# Patient Record
Sex: FEMALE | Race: BLACK OR AFRICAN AMERICAN | NOT HISPANIC OR LATINO
[De-identification: names, ages, dates, MRNs, and addresses within clinical notes are randomized per-mention and may not be internally consistent; named-entity substitution may affect disease eponyms.]

---

## 2017-02-03 ENCOUNTER — TRANSCRIPTION ENCOUNTER (OUTPATIENT)
Age: 42
End: 2017-02-03

## 2017-03-30 ENCOUNTER — EMERGENCY (EMERGENCY)
Facility: HOSPITAL | Age: 42
LOS: 1 days | Discharge: HOME | End: 2017-03-30
Attending: EMERGENCY MEDICINE
Payer: COMMERCIAL

## 2017-03-30 VITALS
HEART RATE: 94 BPM | SYSTOLIC BLOOD PRESSURE: 146 MMHG | WEIGHT: 199.96 LBS | RESPIRATION RATE: 20 BRPM | HEIGHT: 62 IN | TEMPERATURE: 98 F | DIASTOLIC BLOOD PRESSURE: 89 MMHG | OXYGEN SATURATION: 100 %

## 2017-03-30 DIAGNOSIS — J98.01 ACUTE BRONCHOSPASM: ICD-10-CM

## 2017-03-30 DIAGNOSIS — R51 HEADACHE: ICD-10-CM

## 2017-03-30 DIAGNOSIS — R91.1 SOLITARY PULMONARY NODULE: ICD-10-CM

## 2017-03-30 LAB
ALBUMIN SERPL ELPH-MCNC: 4.4 G/DL — SIGNIFICANT CHANGE UP (ref 3.3–5.2)
ALP SERPL-CCNC: 53 U/L — SIGNIFICANT CHANGE UP (ref 40–120)
ALT FLD-CCNC: 19 U/L — SIGNIFICANT CHANGE UP
ANION GAP SERPL CALC-SCNC: 14 MMOL/L — SIGNIFICANT CHANGE UP (ref 5–17)
AST SERPL-CCNC: 25 U/L — SIGNIFICANT CHANGE UP
BASOPHILS # BLD AUTO: 0 K/UL — SIGNIFICANT CHANGE UP (ref 0–0.2)
BASOPHILS NFR BLD AUTO: 0.6 % — SIGNIFICANT CHANGE UP (ref 0–2)
BILIRUB SERPL-MCNC: 0.6 MG/DL — SIGNIFICANT CHANGE UP (ref 0.4–2)
BUN SERPL-MCNC: 11 MG/DL — SIGNIFICANT CHANGE UP (ref 8–20)
CALCIUM SERPL-MCNC: 9.9 MG/DL — SIGNIFICANT CHANGE UP (ref 8.6–10.2)
CHLORIDE SERPL-SCNC: 97 MMOL/L — LOW (ref 98–107)
CO2 SERPL-SCNC: 25 MMOL/L — SIGNIFICANT CHANGE UP (ref 22–29)
CREAT SERPL-MCNC: 0.63 MG/DL — SIGNIFICANT CHANGE UP (ref 0.5–1.3)
EOSINOPHIL # BLD AUTO: 0.1 K/UL — SIGNIFICANT CHANGE UP (ref 0–0.5)
EOSINOPHIL NFR BLD AUTO: 2.5 % — SIGNIFICANT CHANGE UP (ref 0–6)
GLUCOSE SERPL-MCNC: 92 MG/DL — SIGNIFICANT CHANGE UP (ref 70–115)
HCG UR QL: NEGATIVE — SIGNIFICANT CHANGE UP
HCT VFR BLD CALC: 35.9 % — LOW (ref 37–47)
HGB BLD-MCNC: 11.3 G/DL — LOW (ref 12–16)
LYMPHOCYTES # BLD AUTO: 1.2 K/UL — SIGNIFICANT CHANGE UP (ref 1–4.8)
LYMPHOCYTES # BLD AUTO: 36.6 % — SIGNIFICANT CHANGE UP (ref 20–55)
MCHC RBC-ENTMCNC: 26.3 PG — LOW (ref 27–31)
MCHC RBC-ENTMCNC: 31.5 G/DL — LOW (ref 32–36)
MCV RBC AUTO: 83.5 FL — SIGNIFICANT CHANGE UP (ref 81–99)
MONOCYTES # BLD AUTO: 0.5 K/UL — SIGNIFICANT CHANGE UP (ref 0–0.8)
MONOCYTES NFR BLD AUTO: 16.4 % — HIGH (ref 3–10)
NEUTROPHILS # BLD AUTO: 1.4 K/UL — LOW (ref 1.8–8)
NEUTROPHILS NFR BLD AUTO: 43.9 % — SIGNIFICANT CHANGE UP (ref 37–73)
PLATELET # BLD AUTO: 326 K/UL — SIGNIFICANT CHANGE UP (ref 150–400)
POTASSIUM SERPL-MCNC: 4.1 MMOL/L — SIGNIFICANT CHANGE UP (ref 3.5–5.3)
POTASSIUM SERPL-SCNC: 4.1 MMOL/L — SIGNIFICANT CHANGE UP (ref 3.5–5.3)
PROT SERPL-MCNC: 8.8 G/DL — HIGH (ref 6.6–8.7)
RBC # BLD: 4.3 M/UL — LOW (ref 4.4–5.2)
RBC # FLD: 14 % — SIGNIFICANT CHANGE UP (ref 11–15.6)
SODIUM SERPL-SCNC: 136 MMOL/L — SIGNIFICANT CHANGE UP (ref 135–145)
WBC # BLD: 3.2 K/UL — LOW (ref 4.8–10.8)
WBC # FLD AUTO: 3.2 K/UL — LOW (ref 4.8–10.8)

## 2017-03-30 PROCEDURE — 93005 ELECTROCARDIOGRAM TRACING: CPT

## 2017-03-30 PROCEDURE — 71250 CT THORAX DX C-: CPT

## 2017-03-30 PROCEDURE — 99284 EMERGENCY DEPT VISIT MOD MDM: CPT

## 2017-03-30 PROCEDURE — 81025 URINE PREGNANCY TEST: CPT

## 2017-03-30 PROCEDURE — 94640 AIRWAY INHALATION TREATMENT: CPT

## 2017-03-30 PROCEDURE — 93010 ELECTROCARDIOGRAM REPORT: CPT

## 2017-03-30 PROCEDURE — 99284 EMERGENCY DEPT VISIT MOD MDM: CPT | Mod: 25

## 2017-03-30 PROCEDURE — 80053 COMPREHEN METABOLIC PANEL: CPT

## 2017-03-30 PROCEDURE — 71020: CPT | Mod: 26

## 2017-03-30 PROCEDURE — 84702 CHORIONIC GONADOTROPIN TEST: CPT

## 2017-03-30 PROCEDURE — 71250 CT THORAX DX C-: CPT | Mod: 26

## 2017-03-30 PROCEDURE — 85027 COMPLETE CBC AUTOMATED: CPT

## 2017-03-30 PROCEDURE — 71046 X-RAY EXAM CHEST 2 VIEWS: CPT

## 2017-03-30 RX ORDER — ALBUTEROL 90 UG/1
2.5 AEROSOL, METERED ORAL ONCE
Qty: 0 | Refills: 0 | Status: COMPLETED | OUTPATIENT
Start: 2017-03-30 | End: 2017-03-30

## 2017-03-30 RX ORDER — ALBUTEROL 90 UG/1
2 AEROSOL, METERED ORAL
Qty: 1 | Refills: 0 | OUTPATIENT
Start: 2017-03-30 | End: 2017-04-29

## 2017-03-30 RX ADMIN — ALBUTEROL 2.5 MILLIGRAM(S): 90 AEROSOL, METERED ORAL at 12:48

## 2017-03-30 RX ADMIN — Medication 40 MILLIGRAM(S): at 12:48

## 2017-03-30 NOTE — ED STATDOCS - OBJECTIVE STATEMENT
40 y/o F pt presents to ED c/o nonproductive cough, headache and subjective fevers for a few days. No medication taken. No hx of DVT or PE. no neck pain. no chest pain or sob. no abd pain. no n/v/d. no urinary f/u/d. no back pain. no motor or sensory deficits. denies drug use. no recent travel. no rash. no other acute issues symptoms or concerns

## 2017-03-30 NOTE — ED STATDOCS - NS ED MD SCRIBE ATTENDING SCRIBE SECTIONS
REVIEW OF SYSTEMS/INTAKE ASSESSMENT/SCREENINGS/HISTORY OF PRESENT ILLNESS/DISPOSITION/VITAL SIGNS( Pullset)/PHYSICAL EXAM/HIV/PAST MEDICAL/SURGICAL/SOCIAL HISTORY

## 2017-03-30 NOTE — ED STATDOCS - MEDICAL DECISION MAKING DETAILS
cough/bronchospasm: CXR concerning for sarcoidosis vs lymphoma, labs, chest CT, neb tx, prednisone f/u with pulmonology

## 2017-03-30 NOTE — ED STATDOCS - CARE PLAN
Principal Discharge DX:	Bronchospasm Principal Discharge DX:	Bronchospasm  Secondary Diagnosis:	Pulmonary nodule

## 2017-03-30 NOTE — ED STATDOCS - ATTENDING CONTRIBUTION TO CARE
I, Mickey Conrad, performed the initial face to face bedside interview with this patient regarding history of present illness, review of symptoms and relevant past medical, social and family history.  I completed an independent physical examination.  I was the initial provider who evaluated this patient.  The history, relevant review of systems, past medical and surgical history, medical decision making, and physical examination was documented by the scribe in my presence and I attest to the accuracy of the documentation.  I have signed out the follow up of any pending tests (i.e. labs, radiological studies) to the ACP.  I have communicated the patient’s plan of care and disposition with the ACP.

## 2017-03-30 NOTE — ED STATDOCS - INTERPRETATION
normal sinus rhythm, Normal axis, Normal NH interval and QRS complex. There are no acute ischemic ST or T-wave changes.

## 2017-03-30 NOTE — ED ADULT NURSE NOTE - OBJECTIVE STATEMENT
pt c/o persistent cough x 1 week, and today felt headache,  reports he has noticed wife gets more short of breath with exertion than usual. pt denies pain, iv placed, pt breathing even and unlabored

## 2017-03-30 NOTE — ED STATDOCS - PROGRESS NOTE DETAILS
patient re-evaluated c/o cough x 1 month, with intermittent fevers, denies any weight loss, n/v/d or any sick contacts or recent travel or rashes, PE: Chest CTAB, heart: s1s2, CXR abnormal from prior CXR sarcoidosis vs lymphoma, labs and CT chest ordered, will re-evaluate patient informed of pulmonary findings of nodules on CT will need f/u with pulm, patient understood and agrees to proceed.

## 2017-04-07 ENCOUNTER — APPOINTMENT (OUTPATIENT)
Dept: PULMONOLOGY | Facility: CLINIC | Age: 42
End: 2017-04-07

## 2017-04-25 ENCOUNTER — RECORD ABSTRACTING (OUTPATIENT)
Age: 42
End: 2017-04-25

## 2017-04-25 ENCOUNTER — APPOINTMENT (OUTPATIENT)
Dept: PULMONOLOGY | Facility: CLINIC | Age: 42
End: 2017-04-25

## 2017-04-25 VITALS
OXYGEN SATURATION: 97 % | SYSTOLIC BLOOD PRESSURE: 140 MMHG | WEIGHT: 218 LBS | DIASTOLIC BLOOD PRESSURE: 78 MMHG | BODY MASS INDEX: 41.16 KG/M2 | HEIGHT: 61 IN | HEART RATE: 93 BPM

## 2017-04-25 DIAGNOSIS — Z83.3 FAMILY HISTORY OF DIABETES MELLITUS: ICD-10-CM

## 2017-04-25 DIAGNOSIS — Z86.69 PERSONAL HISTORY OF OTHER DISEASES OF THE NERVOUS SYSTEM AND SENSE ORGANS: ICD-10-CM

## 2017-04-25 DIAGNOSIS — R09.81 NASAL CONGESTION: ICD-10-CM

## 2017-04-25 RX ORDER — OMEGA-3/DHA/EPA/FISH OIL 300-1000MG
CAPSULE ORAL
Refills: 0 | Status: ACTIVE | COMMUNITY

## 2017-04-25 RX ORDER — CHROMIUM 200 MCG
TABLET ORAL
Refills: 0 | Status: ACTIVE | COMMUNITY

## 2017-04-25 RX ORDER — ALBUTEROL SULFATE 90 UG/1
108 (90 BASE) AEROSOL, METERED RESPIRATORY (INHALATION)
Refills: 0 | Status: ACTIVE | COMMUNITY

## 2017-04-25 RX ORDER — FLUTICASONE PROPIONATE 100 UG/1
100 POWDER, METERED RESPIRATORY (INHALATION) TWICE DAILY
Qty: 1 | Refills: 3 | Status: ACTIVE | COMMUNITY
Start: 2017-04-25 | End: 1900-01-01

## 2017-04-26 RX ORDER — FLUTICASONE PROPIONATE 220 MCG
0 AEROSOL WITH ADAPTER (GRAM) INHALATION
Qty: 60 | Refills: 0 | COMMUNITY
Start: 2017-04-26

## 2017-05-01 ENCOUNTER — OUTPATIENT (OUTPATIENT)
Dept: OUTPATIENT SERVICES | Facility: HOSPITAL | Age: 42
LOS: 1 days | End: 2017-05-01

## 2017-05-01 ENCOUNTER — APPOINTMENT (OUTPATIENT)
Dept: NUCLEAR MEDICINE | Facility: CLINIC | Age: 42
End: 2017-05-01

## 2017-05-01 DIAGNOSIS — R59.0 LOCALIZED ENLARGED LYMPH NODES: ICD-10-CM

## 2017-05-01 DIAGNOSIS — R91.8 OTHER NONSPECIFIC ABNORMAL FINDING OF LUNG FIELD: ICD-10-CM

## 2017-05-05 ENCOUNTER — MESSAGE (OUTPATIENT)
Age: 42
End: 2017-05-05

## 2017-05-05 DIAGNOSIS — E04.1 NONTOXIC SINGLE THYROID NODULE: ICD-10-CM

## 2017-05-09 RX ORDER — OLOPATADINE HYDROCHLORIDE 1 MG/ML
0 SOLUTION/ DROPS OPHTHALMIC
Qty: 5 | Refills: 0 | COMMUNITY
Start: 2017-05-09

## 2017-05-09 RX ORDER — CETIRIZINE HYDROCHLORIDE 10 MG/1
0 TABLET ORAL
Qty: 30 | Refills: 0 | COMMUNITY
Start: 2017-05-09

## 2017-05-10 ENCOUNTER — APPOINTMENT (OUTPATIENT)
Dept: THORACIC SURGERY | Facility: CLINIC | Age: 42
End: 2017-05-10

## 2017-05-10 VITALS
RESPIRATION RATE: 18 BRPM | DIASTOLIC BLOOD PRESSURE: 90 MMHG | OXYGEN SATURATION: 100 % | HEART RATE: 86 BPM | WEIGHT: 220 LBS | SYSTOLIC BLOOD PRESSURE: 131 MMHG | BODY MASS INDEX: 40.48 KG/M2 | HEIGHT: 62 IN

## 2017-05-10 DIAGNOSIS — J45.909 UNSPECIFIED ASTHMA, UNCOMPLICATED: ICD-10-CM

## 2017-05-10 DIAGNOSIS — R01.1 CARDIAC MURMUR, UNSPECIFIED: ICD-10-CM

## 2017-05-18 ENCOUNTER — OUTPATIENT (OUTPATIENT)
Dept: OUTPATIENT SERVICES | Facility: HOSPITAL | Age: 42
LOS: 1 days | End: 2017-05-18
Payer: COMMERCIAL

## 2017-05-18 VITALS
HEART RATE: 88 BPM | TEMPERATURE: 98 F | HEIGHT: 61 IN | WEIGHT: 222.67 LBS | RESPIRATION RATE: 20 BRPM | DIASTOLIC BLOOD PRESSURE: 110 MMHG | SYSTOLIC BLOOD PRESSURE: 170 MMHG

## 2017-05-18 DIAGNOSIS — Z98.891 HISTORY OF UTERINE SCAR FROM PREVIOUS SURGERY: Chronic | ICD-10-CM

## 2017-05-18 DIAGNOSIS — I10 ESSENTIAL (PRIMARY) HYPERTENSION: ICD-10-CM

## 2017-05-18 DIAGNOSIS — R59.0 LOCALIZED ENLARGED LYMPH NODES: ICD-10-CM

## 2017-05-18 DIAGNOSIS — Z01.818 ENCOUNTER FOR OTHER PREPROCEDURAL EXAMINATION: ICD-10-CM

## 2017-05-18 LAB
ANION GAP SERPL CALC-SCNC: 12 MMOL/L — SIGNIFICANT CHANGE UP (ref 5–17)
APTT BLD: 36.3 SEC — SIGNIFICANT CHANGE UP (ref 27.5–37.4)
BLD GP AB SCN SERPL QL: SIGNIFICANT CHANGE UP
BUN SERPL-MCNC: 11 MG/DL — SIGNIFICANT CHANGE UP (ref 8–20)
CALCIUM SERPL-MCNC: 10.1 MG/DL — SIGNIFICANT CHANGE UP (ref 8.6–10.2)
CHLORIDE SERPL-SCNC: 101 MMOL/L — SIGNIFICANT CHANGE UP (ref 98–107)
CO2 SERPL-SCNC: 28 MMOL/L — SIGNIFICANT CHANGE UP (ref 22–29)
CREAT SERPL-MCNC: 0.49 MG/DL — LOW (ref 0.5–1.3)
EOSINOPHIL # BLD AUTO: 0.1 K/UL — SIGNIFICANT CHANGE UP (ref 0–0.5)
EOSINOPHIL NFR BLD AUTO: 5 % — SIGNIFICANT CHANGE UP (ref 0–5)
GLUCOSE SERPL-MCNC: 98 MG/DL — SIGNIFICANT CHANGE UP (ref 70–115)
HCT VFR BLD CALC: 35.4 % — LOW (ref 37–47)
HGB BLD-MCNC: 11.2 G/DL — LOW (ref 12–16)
INR BLD: 1.09 RATIO — SIGNIFICANT CHANGE UP (ref 0.88–1.16)
LYMPHOCYTES # BLD AUTO: 1 K/UL — SIGNIFICANT CHANGE UP (ref 1–4.8)
LYMPHOCYTES # BLD AUTO: 39 % — SIGNIFICANT CHANGE UP (ref 20–55)
MCHC RBC-ENTMCNC: 26.4 PG — LOW (ref 27–31)
MCHC RBC-ENTMCNC: 31.6 G/DL — LOW (ref 32–36)
MCV RBC AUTO: 83.5 FL — SIGNIFICANT CHANGE UP (ref 81–99)
MONOCYTES # BLD AUTO: 0.3 K/UL — SIGNIFICANT CHANGE UP (ref 0–0.8)
MONOCYTES NFR BLD AUTO: 14 % — HIGH (ref 3–10)
NEUTROPHILS # BLD AUTO: 0.9 K/UL — LOW (ref 1.8–8)
NEUTROPHILS NFR BLD AUTO: 42 % — SIGNIFICANT CHANGE UP (ref 37–73)
PLAT MORPH BLD: NORMAL — SIGNIFICANT CHANGE UP
PLATELET # BLD AUTO: 309 K/UL — SIGNIFICANT CHANGE UP (ref 150–400)
POTASSIUM SERPL-MCNC: 4.1 MMOL/L — SIGNIFICANT CHANGE UP (ref 3.5–5.3)
POTASSIUM SERPL-SCNC: 4.1 MMOL/L — SIGNIFICANT CHANGE UP (ref 3.5–5.3)
PROTHROM AB SERPL-ACNC: 12 SEC — SIGNIFICANT CHANGE UP (ref 9.8–12.7)
RBC # BLD: 4.24 M/UL — LOW (ref 4.4–5.2)
RBC # FLD: 15.2 % — SIGNIFICANT CHANGE UP (ref 11–15.6)
RBC BLD AUTO: NORMAL — SIGNIFICANT CHANGE UP
SODIUM SERPL-SCNC: 141 MMOL/L — SIGNIFICANT CHANGE UP (ref 135–145)
TYPE + AB SCN PNL BLD: SIGNIFICANT CHANGE UP
WBC # BLD: 2.4 K/UL — LOW (ref 4.8–10.8)
WBC # FLD AUTO: 2.4 K/UL — LOW (ref 4.8–10.8)

## 2017-05-18 RX ORDER — CEFAZOLIN SODIUM 1 G
2000 VIAL (EA) INJECTION ONCE
Qty: 0 | Refills: 0 | Status: DISCONTINUED | OUTPATIENT
Start: 2017-05-24 | End: 2017-06-08

## 2017-05-18 RX ORDER — SODIUM CHLORIDE 9 MG/ML
3 INJECTION INTRAMUSCULAR; INTRAVENOUS; SUBCUTANEOUS ONCE
Qty: 0 | Refills: 0 | Status: DISCONTINUED | OUTPATIENT
Start: 2017-05-24 | End: 2017-05-24

## 2017-05-18 NOTE — H&P PST ADULT - FAMILY HISTORY
Mother  Still living? Yes, Estimated age: 61-70  Family history of diabetes mellitus, Age at diagnosis: 51-60

## 2017-05-18 NOTE — H&P PST ADULT - NSANTHOSAYNRD_GEN_A_CORE
No. KANDACE screening performed.  STOP BANG Legend: 0-2 = LOW Risk; 3-4 = INTERMEDIATE Risk; 5-8 = HIGH Risk

## 2017-05-18 NOTE — H&P PST ADULT - HISTORY OF PRESENT ILLNESS
40 yo female with c/o cough for several months, PET scan revealed mediastinal adenopathy, planning EBUS possible medastinoscopy.

## 2017-05-24 ENCOUNTER — TRANSCRIPTION ENCOUNTER (OUTPATIENT)
Age: 42
End: 2017-05-24

## 2017-05-24 ENCOUNTER — RESULT REVIEW (OUTPATIENT)
Age: 42
End: 2017-05-24

## 2017-05-24 ENCOUNTER — OUTPATIENT (OUTPATIENT)
Dept: OUTPATIENT SERVICES | Facility: HOSPITAL | Age: 42
LOS: 1 days | End: 2017-05-24
Payer: COMMERCIAL

## 2017-05-24 ENCOUNTER — APPOINTMENT (OUTPATIENT)
Dept: THORACIC SURGERY | Facility: HOSPITAL | Age: 42
End: 2017-05-24

## 2017-05-24 VITALS
DIASTOLIC BLOOD PRESSURE: 81 MMHG | SYSTOLIC BLOOD PRESSURE: 142 MMHG | TEMPERATURE: 98 F | HEART RATE: 81 BPM | WEIGHT: 222.67 LBS | RESPIRATION RATE: 16 BRPM | OXYGEN SATURATION: 100 % | HEIGHT: 61 IN

## 2017-05-24 VITALS
DIASTOLIC BLOOD PRESSURE: 54 MMHG | RESPIRATION RATE: 18 BRPM | SYSTOLIC BLOOD PRESSURE: 104 MMHG | TEMPERATURE: 98 F | HEART RATE: 93 BPM | OXYGEN SATURATION: 97 %

## 2017-05-24 DIAGNOSIS — Z98.891 HISTORY OF UTERINE SCAR FROM PREVIOUS SURGERY: Chronic | ICD-10-CM

## 2017-05-24 DIAGNOSIS — R59.0 LOCALIZED ENLARGED LYMPH NODES: ICD-10-CM

## 2017-05-24 LAB
ABO RH CONFIRMATION: SIGNIFICANT CHANGE UP
GRAM STN FLD: SIGNIFICANT CHANGE UP
SPECIMEN SOURCE: SIGNIFICANT CHANGE UP

## 2017-05-24 PROCEDURE — 87015 SPECIMEN INFECT AGNT CONCNTJ: CPT

## 2017-05-24 PROCEDURE — 31653 BRONCH EBUS SAMPLNG 3/> NODE: CPT | Mod: GC

## 2017-05-24 PROCEDURE — 85730 THROMBOPLASTIN TIME PARTIAL: CPT

## 2017-05-24 PROCEDURE — 87070 CULTURE OTHR SPECIMN AEROBIC: CPT

## 2017-05-24 PROCEDURE — 31625 BRONCHOSCOPY W/BIOPSY(S): CPT

## 2017-05-24 PROCEDURE — 86900 BLOOD TYPING SEROLOGIC ABO: CPT

## 2017-05-24 PROCEDURE — 88333 PATH CONSLTJ SURG CYTO XM 1: CPT

## 2017-05-24 PROCEDURE — G0463: CPT

## 2017-05-24 PROCEDURE — 88305 TISSUE EXAM BY PATHOLOGIST: CPT | Mod: 26

## 2017-05-24 PROCEDURE — 86920 COMPATIBILITY TEST SPIN: CPT

## 2017-05-24 PROCEDURE — 85610 PROTHROMBIN TIME: CPT

## 2017-05-24 PROCEDURE — 88173 CYTOPATH EVAL FNA REPORT: CPT | Mod: 26,59

## 2017-05-24 PROCEDURE — 86901 BLOOD TYPING SEROLOGIC RH(D): CPT

## 2017-05-24 PROCEDURE — 31654 BRONCH EBUS IVNTJ PERPH LES: CPT

## 2017-05-24 PROCEDURE — 88173 CYTOPATH EVAL FNA REPORT: CPT

## 2017-05-24 PROCEDURE — 85027 COMPLETE CBC AUTOMATED: CPT

## 2017-05-24 PROCEDURE — 88305 TISSUE EXAM BY PATHOLOGIST: CPT

## 2017-05-24 PROCEDURE — 88112 CYTOPATH CELL ENHANCE TECH: CPT

## 2017-05-24 PROCEDURE — 88333 PATH CONSLTJ SURG CYTO XM 1: CPT | Mod: 26

## 2017-05-24 PROCEDURE — 87116 MYCOBACTERIA CULTURE: CPT

## 2017-05-24 PROCEDURE — 87206 SMEAR FLUORESCENT/ACID STAI: CPT

## 2017-05-24 PROCEDURE — 87102 FUNGUS ISOLATION CULTURE: CPT

## 2017-05-24 PROCEDURE — 86850 RBC ANTIBODY SCREEN: CPT

## 2017-05-24 PROCEDURE — 80048 BASIC METABOLIC PNL TOTAL CA: CPT

## 2017-05-24 PROCEDURE — 31629 BRONCHOSCOPY/NEEDLE BX EACH: CPT | Mod: GC

## 2017-05-24 PROCEDURE — 31624 DX BRONCHOSCOPE/LAVAGE: CPT | Mod: GC

## 2017-05-24 PROCEDURE — 88112 CYTOPATH CELL ENHANCE TECH: CPT | Mod: 26

## 2017-05-24 RX ORDER — HYDROMORPHONE HYDROCHLORIDE 2 MG/ML
0.5 INJECTION INTRAMUSCULAR; INTRAVENOUS; SUBCUTANEOUS
Qty: 0 | Refills: 0 | Status: DISCONTINUED | OUTPATIENT
Start: 2017-05-24 | End: 2017-05-24

## 2017-05-24 RX ORDER — AMLODIPINE BESYLATE 2.5 MG/1
1 TABLET ORAL
Qty: 0 | Refills: 0 | COMMUNITY

## 2017-05-24 RX ORDER — SODIUM CHLORIDE 9 MG/ML
1000 INJECTION INTRAMUSCULAR; INTRAVENOUS; SUBCUTANEOUS
Qty: 0 | Refills: 0 | Status: DISCONTINUED | OUTPATIENT
Start: 2017-05-24 | End: 2017-05-24

## 2017-05-24 RX ORDER — FENTANYL CITRATE 50 UG/ML
50 INJECTION INTRAVENOUS
Qty: 0 | Refills: 0 | Status: DISCONTINUED | OUTPATIENT
Start: 2017-05-24 | End: 2017-05-24

## 2017-05-24 RX ORDER — ACETAMINOPHEN 500 MG
1000 TABLET ORAL ONCE
Qty: 0 | Refills: 0 | Status: DISCONTINUED | OUTPATIENT
Start: 2017-05-24 | End: 2017-05-24

## 2017-05-24 RX ORDER — ONDANSETRON 8 MG/1
4 TABLET, FILM COATED ORAL ONCE
Qty: 0 | Refills: 0 | Status: DISCONTINUED | OUTPATIENT
Start: 2017-05-24 | End: 2017-05-24

## 2017-05-24 NOTE — ASU DISCHARGE PLAN (ADULT/PEDIATRIC). - MEDICATION SUMMARY - MEDICATIONS TO TAKE
I will START or STAY ON the medications listed below when I get home from the hospital:    ibuprofen 600 mg oral tablet  -- 1 tab(s) by mouth 4 times a day  -- Do not take this drug if you are pregnant.  It is very important that you take or use this exactly as directed.  Do not skip doses or discontinue unless directed by your doctor.  May cause drowsiness or dizziness.  Obtain medical advice before taking any non-prescription drugs as some may affect the action of this medication.  Take with food or milk.    -- Indication: For Pain    CETIRIZINE HCL 10 MG TABLET  -- Indication: For allergy    albuterol 90 mcg/inh inhalation aerosol  -- 2 puff(s) inhaled 4 times a day  -- For inhalation only.  It is very important that you take or use this exactly as directed.  Do not skip doses or discontinue unless directed by your doctor.  Obtain medical advice before taking any non-prescription drugs as some may affect the action of this medication.  Shake well before use.    -- Indication: For airway    amLODIPine 5 mg oral tablet  -- 1 tab(s) by mouth once a day  -- Indication: For blood pressure    OLOPATADINE HCL 0.1% EYE DROPS  --  to each affected eye , As Needed  -- Indication: For eye drops    FLOVENT 100 MCG DISKUS  --  inhaled 2 times a day  -- Indication: For airway

## 2017-05-24 NOTE — BRIEF OPERATIVE NOTE - PROCEDURE
EBUS, with biopsy  05/24/2017  Flex bronch, endobronchial ultrasound, endobronchial biopsy, transthoracic needle biopsy  Active  NLE

## 2017-05-25 LAB
NIGHT BLUE STAIN TISS: SIGNIFICANT CHANGE UP
SPECIMEN SOURCE: SIGNIFICANT CHANGE UP

## 2017-05-26 LAB
CULTURE RESULTS: SIGNIFICANT CHANGE UP
SPECIMEN SOURCE: SIGNIFICANT CHANGE UP

## 2017-05-31 LAB — CYTOLOGY FNA REPORT: SIGNIFICANT CHANGE UP

## 2017-06-06 ENCOUNTER — APPOINTMENT (OUTPATIENT)
Dept: ULTRASOUND IMAGING | Facility: CLINIC | Age: 42
End: 2017-06-06

## 2017-06-06 ENCOUNTER — OUTPATIENT (OUTPATIENT)
Dept: OUTPATIENT SERVICES | Facility: HOSPITAL | Age: 42
LOS: 1 days | End: 2017-06-06

## 2017-06-06 DIAGNOSIS — Z00.8 ENCOUNTER FOR OTHER GENERAL EXAMINATION: ICD-10-CM

## 2017-06-06 DIAGNOSIS — Z98.891 HISTORY OF UTERINE SCAR FROM PREVIOUS SURGERY: Chronic | ICD-10-CM

## 2017-06-12 ENCOUNTER — APPOINTMENT (OUTPATIENT)
Dept: THORACIC SURGERY | Facility: CLINIC | Age: 42
End: 2017-06-12

## 2017-06-12 VITALS
HEIGHT: 62 IN | SYSTOLIC BLOOD PRESSURE: 136 MMHG | WEIGHT: 220 LBS | OXYGEN SATURATION: 99 % | RESPIRATION RATE: 16 BRPM | HEART RATE: 97 BPM | BODY MASS INDEX: 40.48 KG/M2 | DIASTOLIC BLOOD PRESSURE: 90 MMHG

## 2017-06-12 RX ORDER — CETIRIZINE HYDROCHLORIDE 10 MG/1
10 TABLET, COATED ORAL
Qty: 30 | Refills: 0 | Status: ACTIVE | COMMUNITY
Start: 2017-05-09

## 2017-06-23 ENCOUNTER — APPOINTMENT (OUTPATIENT)
Dept: PULMONOLOGY | Facility: CLINIC | Age: 42
End: 2017-06-23
Payer: COMMERCIAL

## 2017-06-23 VITALS — OXYGEN SATURATION: 96 % | SYSTOLIC BLOOD PRESSURE: 120 MMHG | DIASTOLIC BLOOD PRESSURE: 80 MMHG | HEART RATE: 78 BPM

## 2017-06-23 DIAGNOSIS — E66.8 OTHER DISORDERS OF LUNG: ICD-10-CM

## 2017-06-23 DIAGNOSIS — R91.8 OTHER NONSPECIFIC ABNORMAL FINDING OF LUNG FIELD: ICD-10-CM

## 2017-06-23 DIAGNOSIS — J98.4 OTHER DISORDERS OF LUNG: ICD-10-CM

## 2017-06-23 DIAGNOSIS — R91.1 SOLITARY PULMONARY NODULE: ICD-10-CM

## 2017-06-23 DIAGNOSIS — R59.0 LOCALIZED ENLARGED LYMPH NODES: ICD-10-CM

## 2017-06-23 DIAGNOSIS — R06.83 SNORING: ICD-10-CM

## 2017-06-23 DIAGNOSIS — R06.02 SHORTNESS OF BREATH: ICD-10-CM

## 2017-06-23 PROCEDURE — 99215 OFFICE O/P EST HI 40 MIN: CPT

## 2017-06-26 LAB
CULTURE RESULTS: SIGNIFICANT CHANGE UP
SPECIMEN SOURCE: SIGNIFICANT CHANGE UP

## 2017-07-08 LAB
CULTURE RESULTS: SIGNIFICANT CHANGE UP
SPECIMEN SOURCE: SIGNIFICANT CHANGE UP

## 2017-10-18 ENCOUNTER — OUTPATIENT (OUTPATIENT)
Dept: OUTPATIENT SERVICES | Facility: HOSPITAL | Age: 42
LOS: 1 days | End: 2017-10-18
Payer: COMMERCIAL

## 2017-10-18 DIAGNOSIS — Z98.891 HISTORY OF UTERINE SCAR FROM PREVIOUS SURGERY: Chronic | ICD-10-CM

## 2017-10-18 DIAGNOSIS — G47.33 OBSTRUCTIVE SLEEP APNEA (ADULT) (PEDIATRIC): ICD-10-CM

## 2017-10-18 PROCEDURE — 95810 POLYSOM 6/> YRS 4/> PARAM: CPT | Mod: 26

## 2017-10-18 PROCEDURE — 95810 POLYSOM 6/> YRS 4/> PARAM: CPT

## 2017-10-30 ENCOUNTER — APPOINTMENT (OUTPATIENT)
Dept: PULMONOLOGY | Facility: CLINIC | Age: 42
End: 2017-10-30
Payer: COMMERCIAL

## 2017-10-30 VITALS
HEART RATE: 76 BPM | HEIGHT: 62 IN | WEIGHT: 226 LBS | OXYGEN SATURATION: 98 % | SYSTOLIC BLOOD PRESSURE: 130 MMHG | DIASTOLIC BLOOD PRESSURE: 74 MMHG | BODY MASS INDEX: 41.59 KG/M2

## 2017-10-30 DIAGNOSIS — G47.33 OBSTRUCTIVE SLEEP APNEA (ADULT) (PEDIATRIC): ICD-10-CM

## 2017-10-30 DIAGNOSIS — E66.01 MORBID (SEVERE) OBESITY DUE TO EXCESS CALORIES: ICD-10-CM

## 2017-10-30 PROCEDURE — 99214 OFFICE O/P EST MOD 30 MIN: CPT

## 2018-03-26 ENCOUNTER — APPOINTMENT (OUTPATIENT)
Dept: INTERNAL MEDICINE | Facility: CLINIC | Age: 43
End: 2018-03-26

## 2018-03-28 ENCOUNTER — APPOINTMENT (OUTPATIENT)
Dept: INTERNAL MEDICINE | Facility: CLINIC | Age: 43
End: 2018-03-28

## 2018-08-29 ENCOUNTER — EMERGENCY (EMERGENCY)
Facility: HOSPITAL | Age: 43
LOS: 1 days | Discharge: HOME | End: 2018-08-29
Attending: EMERGENCY MEDICINE
Payer: SELF-PAY

## 2018-08-29 VITALS
SYSTOLIC BLOOD PRESSURE: 161 MMHG | DIASTOLIC BLOOD PRESSURE: 129 MMHG | TEMPERATURE: 99 F | HEART RATE: 82 BPM | RESPIRATION RATE: 18 BRPM | OXYGEN SATURATION: 98 %

## 2018-08-29 VITALS — WEIGHT: 229.94 LBS | HEIGHT: 62 IN

## 2018-08-29 DIAGNOSIS — Z98.891 HISTORY OF UTERINE SCAR FROM PREVIOUS SURGERY: Chronic | ICD-10-CM

## 2018-08-29 PROCEDURE — 99284 EMERGENCY DEPT VISIT MOD MDM: CPT

## 2018-08-29 PROCEDURE — 93971 EXTREMITY STUDY: CPT

## 2018-08-29 PROCEDURE — 73610 X-RAY EXAM OF ANKLE: CPT

## 2018-08-29 PROCEDURE — 73630 X-RAY EXAM OF FOOT: CPT

## 2018-08-29 PROCEDURE — 99284 EMERGENCY DEPT VISIT MOD MDM: CPT | Mod: 25

## 2018-08-29 PROCEDURE — 73610 X-RAY EXAM OF ANKLE: CPT | Mod: 26,RT

## 2018-08-29 PROCEDURE — 73630 X-RAY EXAM OF FOOT: CPT | Mod: 26,RT

## 2018-08-29 PROCEDURE — 93971 EXTREMITY STUDY: CPT | Mod: 26,RT

## 2018-08-29 RX ORDER — IBUPROFEN 200 MG
1 TABLET ORAL
Qty: 28 | Refills: 0 | OUTPATIENT
Start: 2018-08-29 | End: 2018-09-04

## 2018-08-29 NOTE — ED STATDOCS - ATTENDING CONTRIBUTION TO CARE
I, Nirali Shepherd, performed the initial face to face bedside interview with this patient regarding history of present illness, review of symptoms and relevant past medical, social and family history.  I completed an independent physical examination.  I was the initial provider who evaluated this patient. I have signed out the follow up of any pending tests (i.e. labs, radiological studies) to the ACP.  I have communicated the patient’s plan of care and disposition with the ACP.  The history, relevant review of systems, past medical and surgical history, medical decision making, and physical examination was documented by the scribe in my presence and I attest to the accuracy of the documentation.

## 2018-08-29 NOTE — ED STATDOCS - MEDICAL DECISION MAKING DETAILS
Pt with one week of ankle pain. Will check XR and refer to ortho/podiatry Pt with one week of ankle pain ?atraumatic. Will check XR and refer to ortho/podiatry

## 2018-08-29 NOTE — ED ADULT NURSE NOTE - OBJECTIVE STATEMENT
pt arrived with ankle pain, pt denies any trauma, pt with difficulty putting weight on it, pt with good pulses

## 2018-08-29 NOTE — ED STATDOCS - OBJECTIVE STATEMENT
Pt is a 44 y/o F presenting to the ED with c/o right ankle pain, onset 1 week ago. Pt states that she began feeling the pain shortly after taking a 10 hour car drive. She reports taking breaks throughout the car ride and was ambulatory but developed a progressive onset of worsening pain. NO direct trauma. She admits to intermittent foot spasms. Denies swelling and numbness/tingling/weakness to the LE. Pt states she previously strained that ankle several years ago and received PT with improvement. However, pt states this pain feels different. 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 Some relief in pain with 600mg motrin. Pt is a 42 y/o F presenting to the ED with c/o right ankle pain, onset 1 week ago. Pt states that she began feeling the pain shortly after taking a 10 hour car drive. She reports taking breaks throughout the car ride and was ambulatory but developed a progressive onset of worsening pain. NO direct trauma. She admits to intermittent foot spasms. Denies swelling and numbness/tingling/weakness to the LE. Pt denies any calf pain. Pt states she previously sprained that ankle several years ago and received PT with improvement. However, pt states this pain feels different. 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 Some relief in pain with 600mg motrin.

## 2018-08-29 NOTE — ED STATDOCS - RIGHT ANKLE TENDERNESS, MLM
tenderness with inversion and plantarflexion of the foot, no anterior joint line tenderness, no tenderness over the malleoli, no navicular/midfoot/achilles/calcaneal tenderness tenderness with inversion and plantar flexion of the foot, no anterior joint line tenderness, no tenderness over the malleoli, no navicular/midfoot/achilles/calcaneal tenderness

## 2018-08-29 NOTE — ED ADULT NURSE NOTE - NSIMPLEMENTINTERV_GEN_ALL_ED
Implemented All Universal Safety Interventions:  [place] to call system. Call bell, personal items and telephone within reach. Instruct patient to call for assistance. Room bathroom lighting operational. Non-slip footwear when patient is off stretcher. Physically safe environment: no spills, clutter or unnecessary equipment. Stretcher in lowest position, wheels locked, appropriate side rails in place.

## 2018-08-29 NOTE — ED STATDOCS - PHYSICAL EXAMINATION
2+ dp   no gross swelling  no ajlt   no ten malleili  no tend over navuluar  no mid foot tenderness  no tenderness over achhiles  no calcaneal tenderness   tenderness with PF and inversion of the foot 2+ dp   no gross swelling  no ajlt   no ten malleoli  no tend over navicular  no mid foot tenderness  no tenderness over achilles  no calcaneal tenderness   tenderness with PF and inversion of the foot

## 2018-08-29 NOTE — ED STATDOCS - PROGRESS NOTE DETAILS
PT seen by intake MD and agree with HPI/ROS/PE/Plan. Will dc with crutches or cane and ace wrap with podiatry eval.

## 2018-08-29 NOTE — ED ADULT TRIAGE NOTE - CHIEF COMPLAINT QUOTE
'I am having worsening ankle pain. " Pt states she has pain in her ankle for about a week. states feels like a cramping pain and it hurts to walk or put weight on her ankle" PT A & OX4,

## 2018-08-30 PROBLEM — I10 ESSENTIAL (PRIMARY) HYPERTENSION: Chronic | Status: ACTIVE | Noted: 2017-05-18

## 2019-08-15 ENCOUNTER — EMERGENCY (EMERGENCY)
Facility: HOSPITAL | Age: 44
LOS: 1 days | Discharge: HOME | End: 2019-08-15
Attending: EMERGENCY MEDICINE
Payer: COMMERCIAL

## 2019-08-15 VITALS
HEIGHT: 62 IN | OXYGEN SATURATION: 99 % | TEMPERATURE: 99 F | WEIGHT: 229.94 LBS | DIASTOLIC BLOOD PRESSURE: 105 MMHG | HEART RATE: 86 BPM | SYSTOLIC BLOOD PRESSURE: 163 MMHG | RESPIRATION RATE: 16 BRPM

## 2019-08-15 DIAGNOSIS — Z98.891 HISTORY OF UTERINE SCAR FROM PREVIOUS SURGERY: Chronic | ICD-10-CM

## 2019-08-15 PROCEDURE — 93005 ELECTROCARDIOGRAM TRACING: CPT

## 2019-08-15 PROCEDURE — 73030 X-RAY EXAM OF SHOULDER: CPT | Mod: 26,LT

## 2019-08-15 PROCEDURE — 99284 EMERGENCY DEPT VISIT MOD MDM: CPT

## 2019-08-15 PROCEDURE — 73060 X-RAY EXAM OF HUMERUS: CPT | Mod: 26,LT

## 2019-08-15 PROCEDURE — 73060 X-RAY EXAM OF HUMERUS: CPT

## 2019-08-15 PROCEDURE — 99284 EMERGENCY DEPT VISIT MOD MDM: CPT | Mod: 25

## 2019-08-15 PROCEDURE — 93971 EXTREMITY STUDY: CPT

## 2019-08-15 PROCEDURE — 93010 ELECTROCARDIOGRAM REPORT: CPT

## 2019-08-15 PROCEDURE — 73030 X-RAY EXAM OF SHOULDER: CPT

## 2019-08-15 PROCEDURE — 93971 EXTREMITY STUDY: CPT | Mod: 26,LT

## 2019-08-15 RX ORDER — ACETAMINOPHEN 500 MG
975 TABLET ORAL ONCE
Refills: 0 | Status: COMPLETED | OUTPATIENT
Start: 2019-08-15 | End: 2019-08-15

## 2019-08-15 RX ADMIN — Medication 975 MILLIGRAM(S): at 13:37

## 2019-08-15 NOTE — ED STATDOCS - CLINICAL SUMMARY MEDICAL DECISION MAKING FREE TEXT BOX
Pt presenting with atraumatic left shoulder and arm pain. Notes mild swelling. Will obtain xray of shoulder and humerus, US to rule UE DVT given swelling. If negative work up, dc with ortho follow up./ NO weakness, no numbness

## 2019-08-15 NOTE — ED STATDOCS - PROGRESS NOTE DETAILS
NICHOLAS NYE: No acute findings on US. + Calcific tendonitis appreciated on xray. Will place pt in sling for comfort and educate on maintaining ROM of arm. NSAIDS for pain and ortho follow up ( Dr. Aviles )

## 2019-08-15 NOTE — ED ADULT NURSE NOTE - NSIMPLEMENTINTERV_GEN_ALL_ED
Implemented All Universal Safety Interventions:  [place] to call system. Call bell, personal items and telephone within reach. Instruct patient to call for assistance. Room bathroom lighting operational. Non-slip footwear when patient is off stretcher. Physically safe environment: no spills, clutter or unnecessary equipment. Stretcher in lowest position, wheels locked, appropriate side rails in place.

## 2019-08-15 NOTE — ED STATDOCS - OBJECTIVE STATEMENT
Pt is a 43 y/o RHD F presenting to the ED with c/o left arm pain for the last five days. Pt states the pain began in the mid upper arm spontaneously and has been progressively worsening since. She states the pain is worse with certain positions and she can only abduct the arm if she lifts it herself. She notes a sharp pain in the deltoid region of the arm. + swelling to the left wrist. Denies numbness/tingling/weakness, fever, CP, SOB. No trauma or injury to the arm. She reports going to urgent care regarding her sxs yesterday. Did not have any imaging or testing done and was advised to f/u with ortho. However, she states the pain became so intense last night, not rleived with motrin, prompting her visit today. Last dose motrin at 0800 this AM.

## 2019-08-15 NOTE — ED ADULT NURSE NOTE - OBJECTIVE STATEMENT
Pt presents to ED A&Ox3 c/o left arm pain x5 days, worse with movement and radiation to left deltoid. Pt denies any chest pain or discomfort. Denies any sob or difficulty breathing. Left wrist with + swelling, states she was told to f/u with an orthopedic. + peripheral pulses. denies any other complaints at this time, limited ROM due to pain. Pending xrays and EKG to determine further dispo.

## 2019-08-15 NOTE — ED STATDOCS - MUSCULOSKELETAL, MLM
LUE exam: pain with supination, mild anterior shoulder TTP, pain with abduction, FROM, no spinal tenderness

## 2019-08-29 ENCOUNTER — APPOINTMENT (OUTPATIENT)
Dept: ORTHOPEDIC SURGERY | Facility: CLINIC | Age: 44
End: 2019-08-29
Payer: COMMERCIAL

## 2019-08-29 VITALS
HEART RATE: 83 BPM | WEIGHT: 226 LBS | DIASTOLIC BLOOD PRESSURE: 109 MMHG | BODY MASS INDEX: 41.59 KG/M2 | HEIGHT: 62 IN | SYSTOLIC BLOOD PRESSURE: 166 MMHG

## 2019-08-29 DIAGNOSIS — M75.32 CALCIFIC TENDINITIS OF LEFT SHOULDER: ICD-10-CM

## 2019-08-29 DIAGNOSIS — M25.512 PAIN IN LEFT SHOULDER: ICD-10-CM

## 2019-08-29 PROCEDURE — 99203 OFFICE O/P NEW LOW 30 MIN: CPT

## 2019-08-29 RX ORDER — MELOXICAM 15 MG/1
15 TABLET ORAL DAILY
Qty: 14 | Refills: 0 | Status: ACTIVE | COMMUNITY
Start: 2019-08-29 | End: 1900-01-01

## 2019-09-01 NOTE — DISCUSSION/SUMMARY
[de-identified] : 44F with left shoulder pain, imaging suggested of calcific tendinitis of the rotator cuff\par \par -We discussed in detail the clinical and imaging findings\par -We discussed the pathophysiology and natural history of patient's condition\par -I referred her to OT to work on ROM, pain control, edema control, modalities, strengthening, home exercises.  WBAT. \par -I prescribed a short course of meloxicam 15 mg daily for two weeks.  We discussed the GI and renal side effects of NSAIDs.\par -I referred her to see my sports medicine colleague Dr. Dwyer for possible steroid injection\par -I will see her as needed\par -Patient had the opportunity to ask questions and she was in agreement with the plan\par -Over 50% of the time spent with the patient was on counseling the patient on the above diagnosis, treatment plan and prognosis.\par

## 2019-09-01 NOTE — REASON FOR VISIT
[Initial Visit] : an initial visit for [Shoulder Pain] : shoulder pain [Other: ____] : [unfilled] [FreeTextEntry2] : Seen in [facility] ER 08/15/2019

## 2019-09-01 NOTE — HISTORY OF PRESENT ILLNESS
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  It started around 8/10/19.  She went to an urgent care center and then to [facility] ED as pain worsened over time.  Imaging indicated calcific tendinitis and she was referred to our office for follow up.\par \par She denies prior injury.\par Currently her pain is sharp, stabbing, with radiation along the arm down to the wrist.\par She denies paresthesia.\par She denies night symptoms.\par Symptoms improve with heat and ibuprofen.\par Symptoms worsen with activity and lying down.\par She is not diabetic.\par She denies history of thyroid disease.\par She denies current tobacco use.\par She denies recreational drug use.\par She does not take any narcotic pain medication.

## 2019-09-01 NOTE — REVIEW OF SYSTEMS
[Joint Pain] : joint pain [Sleep Disturbances] : ~T sleep disturbances [Hot Flashes] : hot flashes [Negative] : Heme/Lymph

## 2019-09-01 NOTE — PHYSICAL EXAM
[de-identified] : GENERAL: Awake, alert, cooperative, answers questions appropriately. No acute distress.  Ambulates independently with a normal gait.\par SKIN: Warm, dry, intact. Color and turgor normal. \par LUNGS: Demonstrates unlabored breathing on room air with no accessory muscle use.\par EXTREMITIES: Warm and well-perfused. \par NEUROLOGICAL: Grossly intact.  Spurling negative\par \par FOCUSED UPPER EXTREMITY EXAM: \par Left Shoulder:\par -skin intact, no erythema, no ecchymosis, swelling; normal contour compared to the other side\par -mild tenderness to palpation lateral shoulder just inferior to the acromion\par -no tenderness to palpation glenohumeral joint line\par -negative Marcos's test\par -no tenderness to palpation AC joint; negative cross-body adduction test\par -no tenderness to palpation long head of biceps\par -decreased AROM due to pain; normal PROM\par -positive impingement sign\par -rotator cuff strength normal with provocative tests\par -negative Yergason's test\par -motor intact Ax/R/U/M/AIN/PIN\par -sensation intact R/U/M\par -2+ radial pulse, digits warm and well perfused\par -compartments soft\par  [de-identified] : No new XRs were taken during today's visit.\par \par XRs of left shoulder (3 views) from 8/15/19 were reviewed with patient, showing no acute fracture or dislocation.  Calcific deposition noted off of greater tuberosity suggestive of calcific tendinitis.

## 2019-10-10 NOTE — ED STATDOCS - CHIEF COMPLAINT
Continue with labetalol and clonidine The patient is a 44y year old Female complaining of arm pain/injury.

## 2020-03-03 NOTE — ED STATDOCS - CCCP TRG CHIEF CMPLNT
few years ago Perla Andreai ( [facility]) as per daughter - negative cath arm pain/injury

## 2020-05-04 ENCOUNTER — TRANSCRIPTION ENCOUNTER (OUTPATIENT)
Age: 45
End: 2020-05-04

## 2021-08-14 ENCOUNTER — TRANSCRIPTION ENCOUNTER (OUTPATIENT)
Age: 46
End: 2021-08-14

## 2022-12-06 ENCOUNTER — EMERGENCY (EMERGENCY)
Facility: HOSPITAL | Age: 47
LOS: 0 days | Discharge: HOME | End: 2022-12-06
Attending: STUDENT IN AN ORGANIZED HEALTH CARE EDUCATION/TRAINING PROGRAM
Payer: COMMERCIAL

## 2022-12-06 VITALS
RESPIRATION RATE: 18 BRPM | DIASTOLIC BLOOD PRESSURE: 92 MMHG | SYSTOLIC BLOOD PRESSURE: 144 MMHG | HEART RATE: 91 BPM | OXYGEN SATURATION: 100 %

## 2022-12-06 VITALS — WEIGHT: 270.07 LBS
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
## 2022-12-06 DIAGNOSIS — S63.501A UNSPECIFIED SPRAIN OF RIGHT WRIST, INITIAL ENCOUNTER: ICD-10-CM

## 2022-12-06 DIAGNOSIS — S93.402A SPRAIN OF UNSPECIFIED LIGAMENT OF LEFT ANKLE, INITIAL ENCOUNTER: ICD-10-CM
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
## 2022-12-06 DIAGNOSIS — M79.675 PAIN IN LEFT TOE(S): ICD-10-CM

## 2022-12-06 DIAGNOSIS — I10 ESSENTIAL (PRIMARY) HYPERTENSION: ICD-10-CM

## 2022-12-06 DIAGNOSIS — S83.92XA SPRAIN OF UNSPECIFIED SITE OF LEFT KNEE, INITIAL ENCOUNTER: ICD-10-CM

## 2022-12-06 DIAGNOSIS — Z98.891 HISTORY OF UTERINE SCAR FROM PREVIOUS SURGERY: Chronic | ICD-10-CM

## 2022-12-06 DIAGNOSIS — M25.562 PAIN IN LEFT KNEE: ICD-10-CM

## 2022-12-06 DIAGNOSIS — M25.572 PAIN IN LEFT ANKLE AND JOINTS OF LEFT FOOT: ICD-10-CM

## 2022-12-06 DIAGNOSIS — Z87.59 PERSONAL HISTORY OF OTHER COMPLICATIONS OF PREGNANCY, CHILDBIRTH AND THE PUERPERIUM: ICD-10-CM

## 2022-12-06 PROCEDURE — 73630 X-RAY EXAM OF FOOT: CPT | Mod: LT

## 2022-12-06 PROCEDURE — 73562 X-RAY EXAM OF KNEE 3: CPT | Mod: 26,LT

## 2022-12-06 PROCEDURE — 99284 EMERGENCY DEPT VISIT MOD MDM: CPT

## 2022-12-06 PROCEDURE — 73610 X-RAY EXAM OF ANKLE: CPT | Mod: 26,LT

## 2022-12-06 PROCEDURE — 73630 X-RAY EXAM OF FOOT: CPT | Mod: 26,LT

## 2022-12-06 PROCEDURE — 73610 X-RAY EXAM OF ANKLE: CPT | Mod: LT

## 2022-12-06 PROCEDURE — 73562 X-RAY EXAM OF KNEE 3: CPT | Mod: LT

## 2022-12-06 RX ORDER — ACETAMINOPHEN 500 MG
650 TABLET ORAL ONCE
Refills: 0 | Status: COMPLETED | OUTPATIENT
Start: 2022-12-06 | End: 2022-12-06

## 2022-12-06 RX ADMIN — Medication 650 MILLIGRAM(S): at 15:12

## 2022-12-06 NOTE — ED STATDOCS - MUSCULOSKELETAL, MLM
Right ankle diffusely swollen. TTP over the right great toe. Right knee TTP diffusely but pt able to range. RLE NVI.

## 2022-12-06 NOTE — ED ADULT NURSE NOTE - NSIMPLEMENTINTERV_GEN_ALL_ED
Implemented All Universal Safety Interventions:  [place] to call system. Call bell, personal items and telephone within reach. Instruct patient to call for assistance. Room bathroom lighting operational. Non-slip footwear when patient is off stretcher. Physically safe environment: no spills, clutter or unnecessary equipment. Stretcher in lowest position, wheels locked, appropriate side rails in place.

## 2022-12-06 NOTE — ED STATDOCS - OBJECTIVE STATEMENT
46 y/o female with a PMHx of HTN presents to the ED s/p mechanical fall. Pt tripped over a curb and fell forward onto outstretched right hand. Pt required assistance to get up. No head trauma. No LOC. Not on anticoagulation. Pt c/o right great toe pain, right knee pain and right ankle pain. Denies n/v/, CP, abd pain. No other complaints at this time.

## 2022-12-06 NOTE — ED ADULT NURSE NOTE - OBJECTIVE STATEMENT
48 y/o female awake alert and oriented x4 presents to ED s/p mechanical fall. pt tripped over a curb and fell forward. Broke the fall with right hand. Denies head injury or loc. not on blood thinners. c/o right ankle, knee pain, toe pain. Denies nausea, vomiting, chest pain, abd pain, diarrhea.

## 2022-12-06 NOTE — ED STATDOCS - ATTENDING APP SHARED VISIT CONTRIBUTION OF CARE
I, Kirill Patel, DO personally saw the patient with LEX.  I have personally performed a face to face diagnostic evaluation on this patient.  I have reviewed the LEX note and agree with the history, exam, and plan of care, except as noted.  I personally saw the patient and performed a substantive portion of the visit including all aspects of the medical decision making.

## 2022-12-06 NOTE — ED STATDOCS - CARE PLAN
1 Principal Discharge DX:	Left ankle sprain  Secondary Diagnosis:	Left knee sprain  Secondary Diagnosis:	Right wrist sprain  Secondary Diagnosis:	Fall

## 2022-12-06 NOTE — ED STATDOCS - NS ED ATTENDING STATEMENT MOD
This was a shared visit with the LEX. I reviewed and verified the documentation and independently performed the documented:

## 2022-12-06 NOTE — ED STATDOCS - NSICDXFAMILYHX_GEN_ALL_CORE_FT
FAMILY HISTORY:  Mother  Still living? Yes, Estimated age: 61-70  Family history of diabetes mellitus, Age at diagnosis: 51-60

## 2022-12-06 NOTE — ED STATDOCS - PROGRESS NOTE DETAILS
48 y/o F with PMH of HTN presents with with multiple injuries following fall today. Pt injured her left foot/ankle, left knee, right wrist. Denies head trauma, LOC. PE: Well appearing. MSK: No obvious deformity to extremities. +TTP left 1st toe, left knee. NO TTP right UE. Full ROM UE and LE joints bilaterally. Sensation intact to light touch in all extremities. 5/5 strength in all extremities. A/P: s/p fall with multiple injuries, plan for imaging, analgsia PRN, reasses. - Denis Carrera PA-C

## 2022-12-06 NOTE — ED STATDOCS - PATIENT PORTAL LINK FT
You can access the FollowMyHealth Patient Portal offered by [facility] by registering at the following website: [email or web address]. By joining [employer]’s FollowMyHealth portal, you will also be able to view your health information using other applications (apps) compatible with our system.

## 2023-08-06 ENCOUNTER — NON-APPOINTMENT (OUTPATIENT)
Age: 48
End: 2023-08-06

## 2023-08-28 ENCOUNTER — APPOINTMENT (OUTPATIENT)
Dept: ORTHOPEDIC SURGERY | Facility: CLINIC | Age: 48
End: 2023-08-28

## 2023-09-06 NOTE — ED STATDOCS - PMH
Hypertension Modified Advancement Flap Text: The defect edges were debeveled with a #15 scalpel blade.  Given the location of the defect, shape of the defect and the proximity to free margins a modified advancement flap was deemed most appropriate.  Using a sterile surgical marker, an appropriate advancement flap was drawn incorporating the defect and placing the expected incisions within the relaxed skin tension lines where possible.    The area thus outlined was incised deep to adipose tissue with a #15 scalpel blade.  The skin margins were undermined to an appropriate distance in all directions utilizing iris scissors.

## 2024-12-03 NOTE — ED ADULT TRIAGE NOTE - WEIGHT IN LBS
[No Acute Distress] : no acute distress [Normal Oropharynx] : normal oropharynx [Normal Appearance] : normal appearance 270 [No Neck Mass] : no neck mass [Normal Rate/Rhythm] : normal rate/rhythm [Normal S1, S2] : normal s1, s2 [No Murmurs] : no murmurs [No Resp Distress] : no resp distress [Clear to Auscultation Bilaterally] : clear to auscultation bilaterally [Normal to Percussion] : normal to percussion [No Abnormalities] : no abnormalities [Benign] : benign [Not Tender] : not tender [Normal Gait] : normal gait [No Clubbing] : no clubbing [No Cyanosis] : no cyanosis [No Edema] : no edema [Normal Color/ Pigmentation] : normal color/ pigmentation [No Focal Deficits] : no focal deficits [Oriented x3] : oriented x3 [Normal Affect] : normal affect [TextBox_2] : Overweight